# Patient Record
(demographics unavailable — no encounter records)

---

## 2022-04-26 NOTE — PDOC1
OB/GYN H&P


Date of Admission:


Date of Admission:  2022 at 08:06





History of Present Illness:


24 y/o  @ 38 weeks 3 days with EDC of 2022 presents to L&D 

complaining of multiple complaints. She admits to shortness of breath that has 

gradually worsened in the past few weeks worse with laying flat and improves 

with sitting up. She denies chest pain or fever. She denies headache, visual 

disturbances, or nausea/vomiting. She admits to irregular contractions for the 

past four weeks. She complains of contraction pain today that is worse 

intensity. She admits to contractions every 3-5 minutes. She admits to vaginal 

pressure. Patient reports vaginal discharge. She denies vaginal bleeding.





Past Medical History:


Cardiovascular:  No pertinent hx


Pulmonary:  No pertinent hx


GI:  No pertinent hx


Heme/Onc:  No pertinent hx


Hepatobiliary:  No pertinent hx


Psych:  No pertinent hx


Musculoskeletal:   low back pain


Rheumatologic:  No pertinent hx


Infectious disease:  No pertinent hx


ENT:  No pertinent hx


Renal/:  No pertinent hx


Endocrine:  No pertinent hx


Dermatology:  No pertinent hx





Past Surgical History:








Social History:


Smoke:  No


ALCOHOL:  none


Drugs:  None





Allergies:


Coded Allergies:  


     No Known Drug Allergies (Unverified , 19)





Physical Exam:


Vital Signs:


BP elevated at admission with BP range 150's/90's.


PE:


GENERAL:       No apparent distress. Alert and oriented.


HEENT:            Head normocephalic, atraumatic.


NECK:              Supple


LUNGS:            Clear to auscultation.


HEART:            RRR, S1, S2 present, pulses intact


ABDOMEN:       Soft, positive bowel sounds.


EXTREMITIES:  No cyanosis or edema.


NEUROLOGIC:  Normal speech, normal tone


PSYCHIATRIC:  Normal affect, normal mood.


SKIN:                No ulceration.


NEURO:  DTR's +2 bilateral patellar


Labs:





                                Laboratory Tests








Test


 22


08:40 22


09:25


 


Urine Collection Type Unknown   


 


Urine Color (Auto) Light yellow   


 


Urine Turbidity Clear   


 


Urine pH (Auto)


 8.5 (<5.0-8.0)


 





 


Urine Specific Gravity


 1.016


(1.000-1.030) 





 


Urine Protein (Auto)


 Negative mg/dL


(Negative) 





 


Urine Glucose (Auto)(UA)


 Negative mg/dL


(Negative) 





 


Urine Ketones (Auto)


 Negative mg/dL


(Negative) 





 


Urine Blood (Auto)


 Negative


(Negative) 





 


Urine Nitrite


 Negative


(Negative) 





 


Urine Bilirubin (Auto)


 Negative


(Negative) 





 


Urine Urobilinogen (Auto)


 Normal mg/dL


(Normal) 





 


Urine Leukocyte Esterase


(Auto) Negative


(Negative) 





 


Urine RBC 0 /HPF (0-2)   


 


Urine WBC 0 /HPF (0-4)   


 


Urine Squamous Epithelial


Cells Few /LPF  


 





 


Urine Bacteria


 0 /HPF (0-FEW)


 





 


Urine Random Creatinine


 132.8 mg/dL


(Not Establ.) 





 


Urine Random Total Protein


 21.3 mg/dL


(Not Establ.) 





 


Urine Protein/Creatinine Ratio


 160 mg/g


(0-200) 





 


Amniotic Fluid Swab Test  Negative  











Assessment & Plan:


24 y/o 


1. IUP at 38 weeks 3 days.


2. History of  section: TOLAC was discussed in the [facility] and 

patient was unsure and then desired TOLAC. Now in the hospital, patient and her 

 are declining TOLAC and would like scheduled  section at 39 

weeks. 


3. Elevated BP: The blood pressure could be elevated due to multiple etiology 

including pain and/or gestational hypertension. No evidence of preeclampsia. The

urine protein/creatinine ratio is normal. Continue to monitor at this time. CBC,

CMP, and uric acid sent to lab. 


4. Dehydration: Patient has not eaten or drank since yesterday. Recommend 

intravenous fluid bolus for 1 Liter. 


5. Contraction: Recommend labor evaluation. The nurse cervical exam was 1 cm and

thick. Re-evaluate for active labor at 2 hours. AmniSure is negative.











LUZ CUNNINGHAM MD              2022 11:13

## 2022-04-26 NOTE — DISCH
DISCHARGE INSTRUCTIONS


Condition on Discharge


Condition on Discharge:  Stable





Activity After Discharge


Activity Instructions for Disc:  Bedrest today


Lifting Instructions after Dis:  No heavy lifting





Diet after Discharge


Diet after Discharge:  Regular





Contacting the DR. after DC


Call your doctor for:  If your condition worsens











LUZ CUNNINGHAM MD              Apr 26, 2022 12:42